# Patient Record
Sex: FEMALE | Race: BLACK OR AFRICAN AMERICAN | Employment: FULL TIME | URBAN - METROPOLITAN AREA
[De-identification: names, ages, dates, MRNs, and addresses within clinical notes are randomized per-mention and may not be internally consistent; named-entity substitution may affect disease eponyms.]

---
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
## 2021-07-02 ENCOUNTER — TRANSFERRED RECORDS (OUTPATIENT)
Dept: HEALTH INFORMATION MANAGEMENT | Facility: CLINIC | Age: 21
End: 2021-07-02

## 2021-07-09 ENCOUNTER — OFFICE VISIT (OUTPATIENT)
Dept: FAMILY MEDICINE | Facility: CLINIC | Age: 21
End: 2021-07-09
Payer: COMMERCIAL

## 2021-07-09 VITALS
WEIGHT: 223.9 LBS | SYSTOLIC BLOOD PRESSURE: 108 MMHG | OXYGEN SATURATION: 97 % | BODY MASS INDEX: 39.66 KG/M2 | DIASTOLIC BLOOD PRESSURE: 68 MMHG | HEART RATE: 84 BPM | TEMPERATURE: 98.7 F | RESPIRATION RATE: 14 BRPM

## 2021-07-09 DIAGNOSIS — K59.00 CONSTIPATION, UNSPECIFIED CONSTIPATION TYPE: Primary | ICD-10-CM

## 2021-07-09 PROCEDURE — 99203 OFFICE O/P NEW LOW 30 MIN: CPT | Performed by: PHYSICIAN ASSISTANT

## 2021-07-09 RX ORDER — POLYETHYLENE GLYCOL 3350 17 G/17G
1 POWDER, FOR SOLUTION ORAL 2 TIMES DAILY
Qty: 527 G | Refills: 3 | Status: SHIPPED | OUTPATIENT
Start: 2021-07-09

## 2021-07-09 NOTE — PATIENT INSTRUCTIONS
Use Miralax twice a day.    Continue stool softener and probiotic daily.    Continue to use magnesium citrate as needed.    Follow-up with GI.

## 2021-07-09 NOTE — LETTER
July 9, 2021      Milla Cohn  [address] [facility] [address]        To Whom It May Concern:    Milla Cohn  was seen on 7/9/2021.  Please excuse her  until 7/12/21 due to illness.        Sincerely,        Ancelmo Orourke PA-C

## 2021-07-09 NOTE — PROGRESS NOTES
"    Assessment & Plan     Constipation, unspecified constipation type    Trial of miralax twice to day in effort to cut back on magnesium citrate. Follow-up with GI.    - GASTROENTEROLOGY ADULT REF CONSULT ONLY; Future  - polyethylene glycol (MIRALAX) 17 GM/Dose powder; Take 17 g (1 capful) by mouth 2 times daily             Patient Instructions   Use Miralax twice a day.    Continue stool softener and probiotic daily.    Continue to use magnesium citrate as needed.    Follow-up with GI.       No follow-ups on file.    SUKI Dorado [facility] SHERMAN Loyola is a 21 year old who presents for the following health issues     HPI     Constipation  Onset/Duration: On and off for 3 years. New onset on 7/2/2021.  Pt [facility] and had no results.   Description:  Frequency of bowel movements: on an almost daily basis. Every other day.  Consistency of stool: \"not solid\". Uses magnesium citrate with water to be abl to go.  Progression of Symptoms: worsening  Accompanying signs and symptoms:    Abdominal pain: YES   Rectal pain: no   Blood in stool: no   Nausea/Vomiting: YES   Weight loss or gain: YES  History:   Similar problems in past: YES- for 3 years on and off  History of abdominal surgery: no  Chronic laxative use: YES  New medications: no  Precipitating or alleviating factors: none  Therapies tried and outcome: magnesium citrate about 2-3 times per week. Sometimes drinks 1/2 bottle, sometimes full. Has also tried miralax, suppositories, milk of magnesia, and enemas in the past. Takes a stool softener and probiotic daily.       Review of Systems   Constitutional, HEENT, cardiovascular, pulmonary, gi and gu systems are negative, except as otherwise noted.        Objective    /68 (BP Location: Right arm, Patient Position: Sitting, Cuff Size: Adult Regular)   Pulse 84   Temp 98.7  F (37.1  C) (Oral)   Resp 14   Wt 101.6 kg (223 lb 14.4 oz)   SpO2 " 97%   BMI 39.66 kg/m    Body mass index is 39.66 kg/m .       Physical Exam   GENERAL: healthy, alert and no distress  EYES: Eyes grossly normal to inspection, PERRL and conjunctivae and sclerae normal  RESP: lungs clear to auscultation - no rales, rhonchi or wheezes  CV: regular rate and rhythm, normal S1 S2, no S3 or S4, no murmur, click or rub, no peripheral edema and peripheral pulses strong  ABDOMEN: Mildly tender all across lower abdomen. No rebound tenderness or guarding. Otherwise soft, nontender, no hepatosplenomegaly, no masses and bowel sounds normal  MS: no gross musculoskeletal defects noted, no edema  SKIN: no suspicious lesions or rashes  NEURO: Normal strength and tone, mentation intact and speech normal  PSYCH: mentation appears normal, affect normal/bright

## 2021-09-22 ENCOUNTER — TRANSFERRED RECORDS (OUTPATIENT)
Dept: HEALTH INFORMATION MANAGEMENT | Facility: CLINIC | Age: 21
End: 2021-09-22